# Patient Record
Sex: MALE
[De-identification: names, ages, dates, MRNs, and addresses within clinical notes are randomized per-mention and may not be internally consistent; named-entity substitution may affect disease eponyms.]

---

## 2020-05-01 ENCOUNTER — HOSPITAL ENCOUNTER (OUTPATIENT)
Dept: HOSPITAL 53 - M ED | Age: 25
Setting detail: OBSERVATION
LOS: 1 days | Discharge: PSYCHIATRIC OR SUBSTANCE USE FACILITY | End: 2020-05-02
Attending: INTERNAL MEDICINE | Admitting: INTERNAL MEDICINE
Payer: COMMERCIAL

## 2020-05-01 VITALS — WEIGHT: 174.83 LBS | HEIGHT: 67 IN | BODY MASS INDEX: 27.44 KG/M2

## 2020-05-01 VITALS — DIASTOLIC BLOOD PRESSURE: 85 MMHG | SYSTOLIC BLOOD PRESSURE: 127 MMHG

## 2020-05-01 VITALS — SYSTOLIC BLOOD PRESSURE: 128 MMHG | DIASTOLIC BLOOD PRESSURE: 79 MMHG

## 2020-05-01 DIAGNOSIS — T14.91XA: ICD-10-CM

## 2020-05-01 DIAGNOSIS — F32.9: ICD-10-CM

## 2020-05-01 DIAGNOSIS — M62.82: Primary | ICD-10-CM

## 2020-05-01 DIAGNOSIS — Z91.5: ICD-10-CM

## 2020-05-01 DIAGNOSIS — T36.4X2A: ICD-10-CM
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
## 2020-05-01 LAB
ALBUMIN SERPL BCG-MCNC: 3.7 GM/DL (ref 3.2–5.2)
ALBUMIN SERPL BCG-MCNC: 4.2 GM/DL (ref 3.2–5.2)
ALT SERPL W P-5'-P-CCNC: 55 U/L (ref 12–78)
ALT SERPL W P-5'-P-CCNC: 64 U/L (ref 12–78)
AMPHETAMINES UR QL SCN: NEGATIVE
APAP SERPL-MCNC: < 2 UG/ML (ref 10–30)
BARBITURATES UR QL SCN: NEGATIVE
BASOPHILS # BLD AUTO: 0 10^3/UL (ref 0–0.2)
BASOPHILS NFR BLD AUTO: 0.4 % (ref 0–1)
BENZODIAZ UR QL SCN: NEGATIVE
BILIRUB CONJ SERPL-MCNC: 0.1 MG/DL (ref 0–0.2)
BILIRUB CONJ SERPL-MCNC: 0.1 MG/DL (ref 0–0.2)
BILIRUB SERPL-MCNC: 0.3 MG/DL (ref 0.2–1)
BILIRUB SERPL-MCNC: 0.6 MG/DL (ref 0.2–1)
BUN SERPL-MCNC: 20 MG/DL (ref 7–18)
BUN SERPL-MCNC: 29 MG/DL (ref 7–18)
BZE UR QL SCN: NEGATIVE
CALCIUM SERPL-MCNC: 8.6 MG/DL (ref 8.5–10.1)
CALCIUM SERPL-MCNC: 8.7 MG/DL (ref 8.5–10.1)
CANNABINOIDS UR QL SCN: NEGATIVE
CHLORIDE SERPL-SCNC: 106 MEQ/L (ref 98–107)
CHLORIDE SERPL-SCNC: 109 MEQ/L (ref 98–107)
CK SERPL-CCNC: 5525 U/L (ref 39–308)
CK SERPL-CCNC: 7952 U/L (ref 39–308)
CO2 SERPL-SCNC: 23 MEQ/L (ref 21–32)
CO2 SERPL-SCNC: 27 MEQ/L (ref 21–32)
CREAT SERPL-MCNC: 1.11 MG/DL (ref 0.7–1.3)
CREAT SERPL-MCNC: 1.14 MG/DL (ref 0.7–1.3)
EOSINOPHIL # BLD AUTO: 0.2 10^3/UL (ref 0–0.5)
EOSINOPHIL NFR BLD AUTO: 1.9 % (ref 0–3)
ETHANOL SERPL-MCNC: < 0.003 % (ref 0–0.01)
GFR SERPL CREATININE-BSD FRML MDRD: > 60 ML/MIN/{1.73_M2} (ref 60–?)
GFR SERPL CREATININE-BSD FRML MDRD: > 60 ML/MIN/{1.73_M2} (ref 60–?)
GLUCOSE SERPL-MCNC: 94 MG/DL (ref 70–100)
GLUCOSE SERPL-MCNC: 98 MG/DL (ref 70–100)
HCT VFR BLD AUTO: 45.8 % (ref 42–52)
HGB BLD-MCNC: 15.5 G/DL (ref 13.5–17.5)
LYMPHOCYTES # BLD AUTO: 2 10^3/UL (ref 1.5–5)
LYMPHOCYTES NFR BLD AUTO: 21.7 % (ref 24–44)
MCH RBC QN AUTO: 30.2 PG (ref 27–33)
MCHC RBC AUTO-ENTMCNC: 33.8 G/DL (ref 32–36.5)
MCV RBC AUTO: 89.3 FL (ref 80–96)
METHADONE UR QL SCN: NEGATIVE
MONOCYTES # BLD AUTO: 0.7 10^3/UL (ref 0–0.8)
MONOCYTES NFR BLD AUTO: 7.3 % (ref 0–5)
NEUTROPHILS # BLD AUTO: 6.1 10^3/UL (ref 1.5–8.5)
NEUTROPHILS NFR BLD AUTO: 67.5 % (ref 36–66)
OPIATES UR QL SCN: NEGATIVE
PCP UR QL SCN: NEGATIVE
PLATELET # BLD AUTO: 176 10^3/UL (ref 150–450)
POTASSIUM SERPL-SCNC: 4.1 MEQ/L (ref 3.5–5.1)
POTASSIUM SERPL-SCNC: 4.6 MEQ/L (ref 3.5–5.1)
PROT SERPL-MCNC: 6.6 GM/DL (ref 6.4–8.2)
PROT SERPL-MCNC: 7.4 GM/DL (ref 6.4–8.2)
RBC # BLD AUTO: 5.13 10^6/UL (ref 4.3–6.1)
SALICYLATES SERPL-MCNC: < 1.7 MG/DL (ref 5–30)
SODIUM SERPL-SCNC: 138 MEQ/L (ref 136–145)
SODIUM SERPL-SCNC: 141 MEQ/L (ref 136–145)
TSH SERPL DL<=0.005 MIU/L-ACNC: 2.44 UIU/ML (ref 0.36–3.74)
WBC # BLD AUTO: 9.1 10^3/UL (ref 4–10)

## 2020-05-01 PROCEDURE — 36415 COLL VENOUS BLD VENIPUNCTURE: CPT

## 2020-05-01 PROCEDURE — 80307 DRUG TEST PRSMV CHEM ANLYZR: CPT

## 2020-05-01 PROCEDURE — 84443 ASSAY THYROID STIM HORMONE: CPT

## 2020-05-01 PROCEDURE — 80053 COMPREHEN METABOLIC PANEL: CPT

## 2020-05-01 PROCEDURE — 96361 HYDRATE IV INFUSION ADD-ON: CPT

## 2020-05-01 PROCEDURE — 82550 ASSAY OF CK (CPK): CPT

## 2020-05-01 PROCEDURE — 80048 BASIC METABOLIC PNL TOTAL CA: CPT

## 2020-05-01 PROCEDURE — 96360 HYDRATION IV INFUSION INIT: CPT

## 2020-05-01 PROCEDURE — 93041 RHYTHM ECG TRACING: CPT

## 2020-05-01 PROCEDURE — 83735 ASSAY OF MAGNESIUM: CPT

## 2020-05-01 PROCEDURE — 80076 HEPATIC FUNCTION PANEL: CPT

## 2020-05-01 PROCEDURE — 85025 COMPLETE CBC W/AUTO DIFF WBC: CPT

## 2020-05-01 PROCEDURE — 93005 ELECTROCARDIOGRAM TRACING: CPT

## 2020-05-01 PROCEDURE — 94760 N-INVAS EAR/PLS OXIMETRY 1: CPT

## 2020-05-01 PROCEDURE — 99285 EMERGENCY DEPT VISIT HI MDM: CPT

## 2020-05-01 PROCEDURE — 85027 COMPLETE CBC AUTOMATED: CPT

## 2020-05-01 RX ADMIN — SODIUM CHLORIDE SCH MLS/HR: 9 INJECTION, SOLUTION INTRAVENOUS at 14:30

## 2020-05-01 RX ADMIN — SODIUM CHLORIDE SCH MLS/HR: 9 INJECTION, SOLUTION INTRAVENOUS at 22:24

## 2020-05-01 RX ADMIN — SODIUM CHLORIDE SCH MLS/HR: 9 INJECTION, SOLUTION INTRAVENOUS at 18:09

## 2020-05-01 NOTE — HPEPDOC
General


Date of Admission


May 1, 2020 at 02:27


Date of Service:  May 1, 2020


Attending Physician:  SUNITHA ARAUJO MD


Chief Complaint


The patient is a 24-year-old male admitted with a reason for visit of 

Rhabdomyolysis.


Source:  Patient


Exam Limitations:  No limitations


Timing/Duration:  24 hours





History of Present Illness


23 yo M with a history of depressive episodes, prior suicide attempt by gun that

did not go off, active , who presented after ingesting 21 pills of 100mg

doxycycline tabs with the goal of committing suicide. He immediately regretted 

it and presented to the ED. Of note he reports recent intensive PT training with

chemical suits with prolonged periods of no hydration and frequent bursts of 

intense physical training. He denies any recent specific psychosocial pressures,

has a girlfriend and his family is in [place].





In the ED, he was hemodynamically stable, afebrile and saturating well on room 

air without acute complaints. Workup was notable for CK 7952--5525-->5570, WBC 

9.1, hgb 15.5, Cr 1.1, /ALT 55, with normal electrolytes, NSR on EKG and 

negative tox screen> While in the ED he was given charcoal on arriva, 2L NS and 

started on 250cc/hr NS and poison control was contacted without any specific 

recommendations beyond expectant supportive management. He is now being admitted

to medicine for management of rhabdomyolysis a with psychiatry consult.





Home Medications


Scheduled


Doxycycline Monohydrate (Doxycycline) 100 Mg Capsule, 100 MG PO Q12H, (Reported)


   FILLED 4/28/20 FOR 14 DAYS 





Allergies


Coded Allergies:  


     No Known Allergies (Unverified , 5/1/20)





Past Medical History


Medical History


Depression


Suicide attempt


Surgical History


None





Family History


Significant Family History:  No pertinent family hx





Social History


* Smoker:  Denies


Alcohol:  Denies


Drugs:  denies


Recent Travel/Sick Contacts:  Denies: Recent travel, Recent sick contacts


Psychosocial History:  Depression, Prior suicide attempt, Suicidal thoughts


Active . Reports good psychosocial support with girlfriend and his 

family that is located in [place]. No smoking, alcohol or illicit drug use.





A-FIB/CHADSVASC


A-FIB History


Current/History of A-Fib/PAF?:  No


Current PO Anticoag Therapy:  No





Age/Risk Factor Scoring


CHADSVASC:  








CHADSVASC Response (Comments) Value


 


Age Risk Factor Age < 65 years old 0


 


Gender Risk Factor Male 0


 


Hx of CHF No 0


 


Hx of HTN No 0


 


Hx of Stroke/TIA/or VTE No 0


 


Hx of Diabetes No 0


 


Hx of Vascular Disease No 0


 


Total  0











Treatment


Treatment ordered:  NONE


Reason Anticoagulant not given:  Not indicated/[id number]





Review of Systems


Constitutional:  Denies: Chills, Fever, Night Sweats


Eyes:  Denies: Pain, Vision change


ENT:  Denies: Head Aches, Ear Pain, Dysphagia


Skin:  Denies: Rash, Lesions, Breakdown


Pulmonary:  Denies: Dyspnea, Cough


Cardiovascular:  Denies: Chest Pain, Palpitations, Orthopnea, Paroxysmal Noc. 

Dyspnea, Lt Headedness


Gastrointestinal:  Denies: Nausea, Vomiting, Abdominal Pain, Diarrhea


Genitourinary:  Denies: Dysuria, Frequency, Incontinence, Retention


Hematologic:  Denies: Bruising, Bleeding Excessively


Endocrine:  Denies: Polydipsia, Polyphagia, Polyuria, Heat Intolerance, Cold 

Intolerance, Other Endocrine Sx


Musculoskeletal:  Denies: Neck Pain, Back Pain, Joint Pain, Muscle Pain, Spasms


Neurological:  Denies: Weakness, Numbness, Change in speech, Confusion


Psych:  Reports: Depression, Thoughts of Harming Other





Physical Examination


General Exam:  Positive: Alert, No Acute Distress


Eye Exam:  Positive: PERRLA, Conjunctiva & lids normal, EOMI; 


   Negative: Sclera icteric


ENT Exam:  Positive: Atraumatic, Mucous membr. moist/pink, Pharynx Normal


Neck Exam:  Positive: Supple; 


   Negative: JVD, thyromegaly


Chest Exam:  Positive: Clear to auscultation, Normal air movement


Heart Exam:  Positive: Rate Normal, Regular Rhythm, Normal S1, Normal S2; 


   Negative: Murmurs, Rubs


Telemetry:  Positive: No significant arrhythmia


Abdomen Exam:  Positive: Normal bowel sounds, Soft; 


   Negative: Tenderness, Hepatospenomegaly


Extremity Exam:  Positive: Normal pulses; 


   Negative: Clubbing, Cyanosis, Edema


Skin Exam:  Positive: Nl turgor and temperature; 


   Negative: Breakdown, Lesion


Neuro Exam:  Positive: Normal Gait, Normal Speech, Cranial Nerves 3-12 NL, 

Reflexes 2+


Psych Exam:  Positive: Mental status NL, Oriented x 3, Other (remorseful, teary,

depressed)





Vital Signs





Vital Signs








  Date Time  Temp Pulse Resp B/P (MAP) Pulse Ox O2 Delivery O2 Flow Rate FiO2


 


5/1/20 07:45  67 16 117/65 (82) 98 Room Air  


 


5/1/20 06:00 96.7       











Laboratory Data


Labs 24H


Laboratory Tests 2


5/1/20 02:40: 


Immature Granulocyte % (Auto) 1.2, Neutrophils (%) (Auto) 67.5H, Lymphocytes (%)

(Auto) 21.7L, Monocytes (%) (Auto) 7.3H, Eosinophils (%) (Auto) 1.9, Basophils 

(%) (Auto) 0.4, Neutrophils # (Auto) 6.1, Lymphocytes # (Auto) 2.0, Monocytes # 

(Auto) 0.7, Eosinophils # (Auto) 0.2, Basophils # (Auto) 0.0, Nucleated Red 

Blood Cells % (auto) 0.0, Anion Gap 9, Glomerular Filtration Rate > 60.0, 

Calcium Level 8.7, Total Bilirubin 0.3, Direct Bilirubin 0.1, Aspartate Amino 

Transf (AST/SGOT) 162H, Alanine Aminotransferase (ALT/SGPT) 64, Alkaline 

Phosphatase 73, Total Creatine Kinase 7952H, Total Protein 7.4, Albumin 4.2, 

Albumin/Globulin Ratio 1.31, Thyroid Stimulating Hormone (TSH) 2.440, 

Salicylates Level < 1.7L, Acetaminophen Level < 2.0L, Ethyl Alcohol Level < 0

.003


5/1/20 02:47: 


Urine Opiates Screen NEGATIVE, Urine Methadone Screen NEGATIVE, Urine 

Barbiturates Screen NEGATIVE, Urine Phencyclidine Screen NEGATIVE, Urine 

Amphetamines Screen NEGATIVE, Urine Benzodiazepines Screen NEGATIVE, Urine 

Cocaine Metabolite Screen NEGATIVE, Urine Cannabinoids Screen NEGATIVE


5/1/20 02:58: Bedside Glucose (Misc Panel) 102


5/1/20 10:01: 


Anion Gap 5L, Glomerular Filtration Rate > 60.0, Calcium Level 8.6, Total 

Bilirubin 0.6#, Direct Bilirubin 0.1, Aspartate Amino Transf (AST/SGOT) 113H, 

Alanine Aminotransferase (ALT/SGPT) 55, Alkaline Phosphatase 67, Total Creatine 

Kinase 5525H, Total Protein 6.6, Albumin 3.7, Albumin/Globulin Ratio 1.28


5/1/20 11:44: Total Creatine Kinase 5570H


CBC/BMP


Laboratory Tests


5/1/20 02:40








5/1/20 10:01











 Assessment/Plan


23 yo M with depression and prior suicide attempt who is being admitted for 

management of rhabdomyolysis 2/2 intensive strenuous exercise and suicide 

gesture/attempt with doxycycline.





Rhabdomyolysis:


-s/p 2L NS in the ED


-continue NS at 250cc/hr


-check AM CK


-discussed the importance of hydration during intense physical training





 Suicide gesture with ongoing depression:


-has never been on pharmacotherapy for depression


-psych consult 


-1:1 sitter





DVT ppx: lovenox


Diet: regular


Dispo: medsurg, obs





Plan / VTE


VTE Prophylaxis Ordered?:  Yes











SUNITHA ARAUJO MD    May 1, 2020 14:46

## 2020-05-01 NOTE — ECGEPIP
[facility] - ED

                                       

                                       Test Date:    2020

Pat Name:     MASTER GARCIA              Department:   

Patient ID:   [id number]                 Room:         -

Gender:       Male                     Technician:   gunner

:          1995               Requested By: Misbah CHAVEZ

Order Number: [id number]     Reading MD:   Misbah Horton

                                 Measurements

Intervals                              Axis          

Rate:         81                       P:            49

CA:           153                      QRS:          44

QRSD:         84                       T:            11

QT:           330                                    

QTc:          385                                    

                           Interpretive Statements

SINUS RHYTHM

NONSPECIFIC T WAVE ABNORMALITIES

NO PRIORS FOR COMPARISON

Electronically Signed on 2020 13:01:00 EDT by Misbah Horton

## 2020-05-02 ENCOUNTER — HOSPITAL ENCOUNTER (INPATIENT)
Dept: HOSPITAL 53 - M PSY | Age: 25
LOS: 3 days | Discharge: HOME | DRG: 885 | End: 2020-05-05
Attending: PSYCHIATRY & NEUROLOGY | Admitting: PSYCHIATRY & NEUROLOGY
Payer: COMMERCIAL

## 2020-05-02 VITALS — DIASTOLIC BLOOD PRESSURE: 67 MMHG | SYSTOLIC BLOOD PRESSURE: 117 MMHG

## 2020-05-02 VITALS — HEIGHT: 67 IN | WEIGHT: 170.2 LBS | BODY MASS INDEX: 26.71 KG/M2

## 2020-05-02 VITALS — SYSTOLIC BLOOD PRESSURE: 150 MMHG | DIASTOLIC BLOOD PRESSURE: 84 MMHG

## 2020-05-02 VITALS — SYSTOLIC BLOOD PRESSURE: 117 MMHG | DIASTOLIC BLOOD PRESSURE: 67 MMHG

## 2020-05-02 DIAGNOSIS — F33.2: Primary | ICD-10-CM

## 2020-05-02 DIAGNOSIS — Z91.5: ICD-10-CM

## 2020-05-02 LAB
ALBUMIN SERPL BCG-MCNC: 3.2 GM/DL (ref 3.2–5.2)
ALT SERPL W P-5'-P-CCNC: 48 U/L (ref 12–78)
BILIRUB SERPL-MCNC: 0.9 MG/DL (ref 0.2–1)
BUN SERPL-MCNC: 12 MG/DL (ref 7–18)
CALCIUM SERPL-MCNC: 8.3 MG/DL (ref 8.5–10.1)
CHLORIDE SERPL-SCNC: 111 MEQ/L (ref 98–107)
CK SERPL-CCNC: 2564 U/L (ref 39–308)
CO2 SERPL-SCNC: 27 MEQ/L (ref 21–32)
CREAT SERPL-MCNC: 0.98 MG/DL (ref 0.7–1.3)
GFR SERPL CREATININE-BSD FRML MDRD: > 60 ML/MIN/{1.73_M2} (ref 60–?)
GLUCOSE SERPL-MCNC: 94 MG/DL (ref 70–100)
HCT VFR BLD AUTO: 40.8 % (ref 42–52)
HGB BLD-MCNC: 14.1 G/DL (ref 13.5–17.5)
MAGNESIUM SERPL-MCNC: 1.9 MG/DL (ref 1.8–2.4)
MCH RBC QN AUTO: 31.3 PG (ref 27–33)
MCHC RBC AUTO-ENTMCNC: 34.6 G/DL (ref 32–36.5)
MCV RBC AUTO: 90.7 FL (ref 80–96)
PLATELET # BLD AUTO: 164 10^3/UL (ref 150–450)
POTASSIUM SERPL-SCNC: 4.2 MEQ/L (ref 3.5–5.1)
PROT SERPL-MCNC: 6 GM/DL (ref 6.4–8.2)
RBC # BLD AUTO: 4.5 10^6/UL (ref 4.3–6.1)
SODIUM SERPL-SCNC: 140 MEQ/L (ref 136–145)
WBC # BLD AUTO: 5.9 10^3/UL (ref 4–10)

## 2020-05-02 RX ADMIN — SODIUM CHLORIDE SCH MLS/HR: 9 INJECTION, SOLUTION INTRAVENOUS at 05:53

## 2020-05-02 RX ADMIN — SODIUM CHLORIDE SCH MLS/HR: 9 INJECTION, SOLUTION INTRAVENOUS at 02:01

## 2020-05-02 NOTE — DS.PDOC
Discharge Summary


General


Date of Admission


May 1, 2020 at 02:27


Date of Discharge


5/2/2020


Attending Physician:  SUNITHA ARAUJO MD





Discharge Summary


PROCEDURES PERFORMED DURING STAY: None





ADMITTING DIAGNOSES: 


1. Rhabdomyolysis.





DISCHARGE DIAGNOSES:


1. Rhabdomyolysis.


2. Suicidal gesture





COMPLICATIONS/CHIEF COMPLAINT: Rhabdomyolysis.





HISTORY OF PRESENT ILLNESS: 


25 yo M with a history of depressive episodes, prior suicide attempt by gun that

did not go off, active , who presented after ingesting 21 pills of 100mg

doxycycline tabs with the goal of committing suicide. He immediately regretted 

it and presented to the ED. Of note he reports recent intensive PT training with

chemical suits with prolonged periods of no hydration and frequent bursts of 

intense physical training. He denies any recent specific psychosocial pressures,

has a girlfriend and his family is in [place].





HOSPITAL COURSE: 


In the ED, he was hemodynamically stable, afebrile and saturating well on room 

air without acute complaints. Workup was notable for CK 7952--5525-->5570, WBC 9

.1, hgb 15.5, Cr 1.1, /ALT 55, with normal electrolytes, NSR on EKG and 

negative tox screen> While in the ED he was given charcoal on arrival, 2L NS and

started on 250cc/hr NS and poison control was contacted without any specific 

recommendations beyond expectant supportive management. He was admitted to 

medicine for management of rhabdomyolysis and continued on IV fluids with 

eventual downtrend of his CK. He is now being discharged to [facility] after 

evaluation by psychiatry.





DISCHARGE MEDICATIONS: Please see below.


 


ALLERGIES: Please see below.





PHYSICAL EXAMINATION ON DISCHARGE:


VITAL SIGNS: Please see below.


GENERAL: NAD


HEENT: NCAT, PERRLA, EOMI, MMM


NECK: supple, no JVD


CARDIOVASCULAR EXAMINATION: RRR, no mrg


RESPIRATORY EXAMINATION: CTAB


ABDOMINAL EXAMINATION: Normoactive sounds, soft, NTND


EXTREMITIES: WWP, no edema


SKIN: No rashes, sequela of bleeding or lesions


NEUROLOGICAL EXAMINATION: CN2-12 intact, normal gait, 5/5 strength and normal 

tone


PSYCHIATRIC EXAMINATION: Aox3, remorseful about suicidal gesture, depressed





LABORATORY DATA: Please see below.





IMAGING: None this admission 





PROGNOSIS: Good





ACTIVITY: As tolerated





DIET: Regular





DISCHARGE PLAN: [facility]





DISPOSITION: [facility]





DISCHARGE INSTRUCTIONS:


1. Follow up with mental health provider





ITEMS TO FOLLOWUP ON ON OUTPATIENT:


1. Depression with suicidal ideation





DISCHARGE CONDITION: Stable





TIME SPENT ON DISCHARGE: 31 minutes.





Vital Signs/I&Os





Vital Signs








  Date Time  Temp Pulse Resp B/P (MAP) Pulse Ox O2 Delivery O2 Flow Rate FiO2


 


5/2/20 06:00 97.7 54 18 117/67 (84) 97 Room Air  














I&O- Last 24 Hours up to 6 AM 


 


 5/2/20





 06:00


 


Intake Total 1920 ml


 


Output Total 0 ml


 


Balance 1920 ml











Laboratory Data


Labs 24H


Laboratory Tests 2


5/1/20 10:01: 


Anion Gap 5L, Glomerular Filtration Rate > 60.0, Calcium Level 8.6, Total 

Bilirubin 0.6#, Direct Bilirubin 0.1, Aspartate Amino Transf (AST/SGOT) 113H, 

Alanine Aminotransferase (ALT/SGPT) 55, Alkaline Phosphatase 67, Total Creatine 

Kinase 5525H, Total Protein 6.6, Albumin 3.7, Albumin/Globulin Ratio 1.28


5/1/20 11:44: Total Creatine Kinase 5570H


5/2/20 05:34: 


Anion Gap 2L, Glomerular Filtration Rate > 60.0, Calcium Level 8.3L, Total 

Bilirubin 0.9, Aspartate Amino Transf (AST/SGOT) 66H, Alanine Aminotransferase 

(ALT/SGPT) 48, Alkaline Phosphatase 59, Total Creatine Kinase 2564H, Total 

Protein 6.0L, Albumin 3.2, Albumin/Globulin Ratio 1.14, Nucleated Red Blood 

Cells % (auto) 0.0, Magnesium Level 1.9


CBC/BMP


Laboratory Tests


5/1/20 10:01








5/2/20 05:34











Discharge Medications


Scheduled


Doxycycline Monohydrate (Doxycycline) 100 Mg Capsule, 100 MG PO Q12H, (Reported)


   FILLED 4/28/20 FOR 14 DAYS 





Allergies


Coded Allergies:  


     No Known Allergies (Unverified , 5/1/20)











SUNITHA ARAUJO MD    May 2, 2020 07:36

## 2020-05-03 VITALS — DIASTOLIC BLOOD PRESSURE: 81 MMHG | SYSTOLIC BLOOD PRESSURE: 134 MMHG

## 2020-05-03 VITALS — DIASTOLIC BLOOD PRESSURE: 56 MMHG | SYSTOLIC BLOOD PRESSURE: 114 MMHG

## 2020-05-03 NOTE — HPEPDOC
General


Date of Admission


May 2, 2020 at 19:30


Date of Service:  May 3, 2020


Attending Physician:  SUNITHA ARAUJO MD


Chief Complaint


The patient is a 24-year-old male admitted with a reason for visit of Other 

Unspecified Depressive Disorder.


Source:  Patient


Exam Limitations:  No limitations





History of Present Illness


25 yo M with a history of depressive episodes, prior suicide attempt by gun that

malfunctioned, active , who presented after ingesting 21 pills of 100mg 

doxycycline tabs with the goal of committing suicide, while in intensive PT 

training with chemical suits with prolonged periods of no hydration and frequent

bursts of intense physical training. i recently admitted him to medicine for 

rhabdomyolysis after he was found to have an elevated CK to 7952 and hydrated 

him with IVF with improved of his rhabdo. Thankfully is renal function was at 

baseline and he had a robust response. After discussing the suicidal 

gesture/attempt, I consulted psychiatry and his was evaluated by Dr. Reyes who 

recommended discharge to the [facility] for evaluation and treatment of his depression

with suicidal ideation and recent gesture/attempt. Today, he reports that he is 

doing well without physical complaints except being anxious to be discharged and

following up as an outpatient.





Allergies


Coded Allergies:  


     No Known Allergies (Unverified , 5/1/20)





Past Medical History


Medical History


Depression with prior suicide attempts


Surgical History


none





Family History


Significant Family History:  No pertinent family hx





Social History


* Smoker:  Denies


Alcohol:  Denies


Drugs:  denies


Recent Travel/Sick Contacts:  Denies: Recent travel, Recent sick contacts


Psychosocial History:  Decreased mood, Depression, Suicidal thoughts


Active . No nicotine dependence, alcohol or illicit drug use





A-FIB/CHADSVASC


A-FIB History


Current/History of A-Fib/PAF?:  No


Current PO Anticoag Therapy:  No





Age/Risk Factor Scoring


CHADSVASC:  








CHADSVASC Response (Comments) Value


 


Age Risk Factor Age < 65 years old 0


 


Gender Risk Factor Male 0


 


Hx of CHF No 0


 


Hx of HTN No 0


 


Hx of Stroke/TIA/or VTE No 0


 


Hx of Diabetes No 0


 


Hx of Vascular Disease No 0


 


Total  0











Treatment


Treatment ordered:  NONE


Reason Anticoagulant not given:  Not indicated/[id number]





Review of Systems


Constitutional:  Denies: Chills, Fever, Night Sweats


Eyes:  Denies: Pain, Vision change


ENT:  Denies: Head Aches, Ear Pain, Dysphagia


Skin:  Denies: Rash, Lesions, Breakdown


Pulmonary:  Denies: Dyspnea, Cough


Cardiovascular:  Denies: Chest Pain, Palpitations, Orthopnea, Paroxysmal Noc. 

Dyspnea, Lt Headedness


Gastrointestinal:  Denies: Nausea, Vomiting, Abdominal Pain, Diarrhea


Genitourinary:  Denies: Dysuria, Frequency, Incontinence, Retention


Hematologic:  Denies: Bruising, Bleeding Excessively


Endocrine:  Denies: Polydipsia, Polyphagia, Polyuria, Heat Intolerance, Cold 

Intolerance, Other Endocrine Sx


Musculoskeletal:  Denies: Neck Pain, Back Pain, Joint Pain, Muscle Pain, Spasms


Neurological:  Denies: Weakness, Numbness, Change in speech, Confusion


Psych:  Reports: Anxiety (to be discharged), Depression; 


   Denies: Thoughts of Self Harm





Physical Examination


General Exam:  Positive: Alert, No Acute Distress


Eye Exam:  Positive: PERRLA, Conjunctiva & lids normal, EOMI; 


   Negative: Sclera icteric


ENT Exam:  Positive: Atraumatic, Mucous membr. moist/pink, Pharynx Normal


Neck Exam:  Positive: Supple; 


   Negative: JVD, thyromegaly


Chest Exam:  Positive: Clear to auscultation, Normal air movement


Heart Exam:  Positive: Rate Normal, Regular Rhythm, Normal S1, Normal S2; 


   Negative: Murmurs, Rubs


Abdomen Exam:  Positive: Normal bowel sounds, Soft; 


   Negative: Tenderness, Hepatospenomegaly


Extremity Exam:  Positive: Normal pulses; 


   Negative: Clubbing, Cyanosis, Edema


Skin Exam:  Positive: Nl turgor and temperature; 


   Negative: Breakdown, Lesion


Neuro Exam:  Positive: Normal Gait, Normal Speech, Cranial Nerves 3-12 NL, 

Reflexes 2+


Psych Exam:  Positive: Mental status NL, Oriented x 3





Vital Signs





Vital Signs








  Date Time  Temp Pulse Resp B/P (MAP) Pulse Ox O2 Delivery O2 Flow Rate FiO2


 


5/3/20 05:56 97.3 73 14 114/56 (75) 99 Room Air  











 Assessment/Plan


25 yo M with a history of depressive episodes, prior suicide attempt by gun that

malfunctioned, active , who presented after ingesting 21 pills of 100mg 

doxycycline tabs with the goal of committing suicide, while in intensive PT 

training with chemical suits with prolonged periods of no hydration and frequent

bursts of intense physical training, who was recently discharged from medicine 

after aggressive hydration for rhabdomyolysis now admitted to the [facility] for 

depression and suicidal gesture/attempt.





Recent rhabdomyolysis:


-s/p aggressive hydration while in inpatient medicine


-At this time counselled patient on adequate hydration especially during 

intensive training





Depression with suicidal gesture/attempt


-Will defer to the primary psychiatry team





At this time, medicine will sign off.





Plan / VTE


VTE Prophylaxis Ordered?:  No


VTE Exclusion Mechanical Proph:  Low Risk for VTE


VTE Exclusion Pharmacological:  At Low Risk for VTE











SUNITHA ARAUJO MD    May 3, 2020 09:11

## 2020-05-04 VITALS — SYSTOLIC BLOOD PRESSURE: 157 MMHG | DIASTOLIC BLOOD PRESSURE: 73 MMHG

## 2020-05-04 VITALS — DIASTOLIC BLOOD PRESSURE: 75 MMHG | SYSTOLIC BLOOD PRESSURE: 132 MMHG

## 2020-05-04 NOTE — MHHPE
DATE OF ADMISSION:  05/02/2020

 

Telemedicine Video Assessment.

 

VITAL SIGNS:  Blood pressure 114/56.  Pulse 73.  Temperature 97.3.

 

CHIEF COMPLAINT:  Feels depressed.

 

SUBJECTIVE:

He is 24-years-old.  He is seen in the presence of staff.  He is admitted to the

hospital, to the inpatient psychiatry unit, from the medical floor, where I had

seen him yesterday on consultation, after he had taken a considerable overdose.

He overdosed on doxycycline, which he had just been prescribed for orchitis and

epididymitis.  He had taken the overdose in an attempt to kill himself.  This is

the second time he has tried killing himself, the first was a couple of years ago

in [place], he tried shooting himself, but the gun did not go off.   Please refer

to my consultation note for details related to the circumstances of his coming to

the hospital, background history, as well as mental status exam at the time, and

the reason for his hospitalization to inpatient mental health.

 

He says he thought he was going to go back to the emergency room, and that

matters were going to be quite enclosed there, but that he feels a bit better

being in the inpatient psychiatry unit.  He says sleep was reasonably good.  He

says he has been in touch with his girlfriend, and it happened today.  She is

[place]se[place], and that went well.  He says he also spoke with his sister, who is

close to, in [place].  He indicated he will probably tell his mother about his

being here eventually, but not his father, concerned father may see this as a

sign of weakness.  Says he is not too bothered, however, what his father may

think.  He also alluded to his parents having a difficult relationship, and that

he had witnessed the difficulties, and felt that was "normal".  Says he does not

think he had nightmares related to that, but began having difficulties with his

own relationships with others.  Says they moved quite a bit because of his father

being in the , he feels that is where his self doubts about forming

relationships set in.

 

PAST PSYCHIATRIC HISTORY AND BACKGROUND HISTORY:

Please refer to previous summaries.

 

Past psychiatric history is significant for his being depressed a couple of years

ago in [place], it lasted for a few months, and on one occasion he tried killing

himself, a gun that did not go off.  He says he took that as a "wake up call".

 

MENTAL STATUS EXAMINATION:

He is neat.  He is wearing a mask because of the virus precautions.  He is

cooperative and possibly a little less guarded than yesterday.  No agitation.  No

psychomotor retardation.  He is coherent.  Affect is somewhat restricted in

range, as far as I can tell.  Denies any thoughts of harming himself at present.

No homicidal ideas or intents.  No evidence of any psychosis.  Intellect average.

Cognition grossly intact.  Judgment and insight remain compromised overall.

 

ASSESSMENT:  Other specified depressive disorder.

Consider major depressive disorder, recurrent.

Status post overdose.

Interpersonal difficulties, in terms of relationships, maintaining them.

Difficult childhood, particularly parents' marriage.

 

PLAN:

He is admitted to the inpatient psych unit and placed on relevant precautions.

We will look at obtaining collateral information.

He will receive a medicine consult if indicated.  He had just been cleared by

medicine yesterday.  He had rhabdomyolysis, and it is thought that his recent

bout of training may also have contributed to that.

He will be encouraged to participate in activities in the unit.

He would potentially benefit from using an antidepressant, and we discussed this,

given his previous bout of what seems to have been quite significant depression,

on top of the prior episode, and now the more recent one, which is current.

After discussion of the various risks and benefits, drawbacks, alternatives,

which he understands, he is started on Prozac at 10 mg daily.  This will need to

be titrated up, provided he tolerates it.  He is aware that he may not need to

await the titration in the hospital, depending on how he does.

He will be discharged with followup once he is stable.  He will be seeing the

assigned psychiatrist tomorrow, when further recommendations will be made,

depending on the clinical picture.  I would anticipate a 3-5 day stay.  Followup

will be at [facility].

 

The assessment took 30 minutes.

## 2020-05-04 NOTE — MHIPNPDOC
[facility] Progress Note


Progress Note








Inpatient Progress Note


Vishnu Mathews


MRN: N/[id number]


Date of Birth: N/A


Date of Service: 05/04/2020


History of Present Illness


24-year-old man presented after overdosing on doxycycline. Has a history of 

depression and multiple stressors.


Interval History


The patient is met with today. He reports he is doing well on the Prozac with no

side effects. He reports he is doing much improved from his depression with more

insight, less fatigue and better motivation. He has been amenable to treatment 

and will engaged per staff reports.


Review Of Systems


General: Denies fever or appetite changes 


Cardiovascular: Denies Chest pain or palpations


GI: Denies Nausea, vomiting, or bowel changes


Respiratory: Denies shortness of breath or cough


Neuro: Denies dizziness, tremors


Derm: Denies any rashes or pruritus


: Denies any dysuria or urinary problems 


MSK: Denies any muscle tightness or stiffness


HEENT: Denies any vision changes or headaches


Psychotherapy


None on this visit.


Vital Signs


Reviewed.


Mental Status Examination


General: Well dressed with good hygiene


Speech: Spontaneous and fluid


Thought processes: Linear and logical


MSK: Smooth and coordinated gait, no signs of tremors or involuntary orofacial 

movements


Thought content: Future orientated


Abstract reasoning, and computation: Intact


Description of associations: Intact


Description of abnormal or psychotic thoughts: Denies any suicidal or homicidal 

ideation. Denies any auditory or visual hallucinations. Does not appear to be 

responding to internal stimuli. Does not appear to be endorsing any bizarre or 

paranoid ideation.


Judgment: fair


Insight: fair


Orientation: Alert and orientated 3


Cognition: Grossly normal


Recent and remote memory: Intact


Attention span and concentration: Intact


Fund of knowledge: Adequate


Mood: "okay"


Affect: Euthymic with a full range


Diagnoses


MDD, moderate to severe, recurrent.


Assessment and Plan


MDD: Continue Prozac.


Disposition


Discharge tomorrow if continues to improve.


Time Spent


15 minutes.


Monday





Vital Signs





Vital Signs








  Date Time  Temp Pulse Resp B/P (MAP) Pulse Ox O2 Delivery O2 Flow Rate FiO2


 


5/4/20 06:20 97.6 61 14 132/75 (94) 99 Room Air  











Current Medications





Current Medications








 Medications


  (Trade)  Dose


 Ordered  Sig/Ruben


 Route


 PRN Reason  Start Time


 Stop Time Status Last Admin


Dose Admin


 


 Acetaminophen


  (Tylenol Tab)  650 mg  Q6HP  PRN


 PO


 HEADACHE or DISCOMFORT  5/2/20 18:30


    5/3/20 08:05





 


 Al Hydrox/Mg


 Hydrox/Simethicone


  (Mylanta)  30 ml  Q4HP  PRN


 PO


 HEARTBURN/INDIGESTION  5/2/20 18:30


     





 


 Fluoxetine HCl


  (PROzac)  10 mg  DAILY


 PO


   5/3/20 09:00


    5/4/20 09:05





 


 Lorazepam


  (Ativan)  1 mg  BIDP  PRN


 PO


 ANXIETY/AGITATION  5/2/20 18:30


     





 


 Magnesium


 Hydroxide


  (Milk Of


 Magnesia)  30 ml  DAILYPRN  PRN


 PO


 CONSTIPATION  5/2/20 18:30


     





 


 Trazodone HCl


  (Desyrel)  50 mg  QHSP  PRN


 PO


 INSOMNIA  5/2/20 18:30


     














Allergies


Coded Allergies:  


     No Known Allergies (Unverified , 5/1/20)











MAEGAN HARDWICK DO               May 4, 2020 09:09

## 2020-05-04 NOTE — CR
TELEMEDICINE VIDEO ASSESSMENT

 

DATE OF CONSULTATION:  05/02/2020

 

CONSULTATION FOR THE HOSPITALIST

 

he chart is reviewed.  The patient is interviewed.  He is interviewed in the

presence of a staff member.

 

CHIEF COMPLAINT:  He took an overdose.

 

SUBJECTIVE:

He is 24-years-old.  He is single.  He has a girlfriend, who apparently is also

in the , she is in [facility] at the moment, and returns in a month or so.

He has no children.  The patient is based at [place], says has been here

possibly about a year or so, was deployed to [facility], returned a couple of months

or so ago, says no  related traumas.  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

or injuries.  He says he was doing well, but a few weeks ago began experiencing

self doubts, suggests it tends to happen periodically, and he indicated they are

related to when he was 15, but did not go into details, says when such thoughts

creep in he feels somewhat depressed, and feels anxious as well.  Says this

tended to worsen, he decided to go to [facility], says has begun

seeing a clinician there, and is due to have his third visit or so on Monday

(today is Saturday).  Over the last couple of weeks, matters have become more

difficult, says he had a hard time getting out of bed, and was eating because he

was supposed to, but he did not think that this impacted his work to any greater

extent, has been doing well there, including training.  Says has a few friends in

the unit, some he possibly also can confide in.  Matters worsened, felt more

depressed, more anxious.  Says had a harder time getting out of bed, but managed

to do so, also had fleeting suicidal thoughts, he says had no firm plans, and

that talking to friends, as well as his girlfriend and his sister, who is in [place], who tend to help.  He was recently also treated for left sided orchitis

with epididymitis, was given doxycycline.  He felt further depressed a couple of

days ago and anxious, and took an overdose.  He took more than 20 doxycycline.

Says he regretted it immediately afterwards, and tried throwing up, and was

brought to the hospital.  He was assessed and admitted to the medical service

because of rhabdomyolysis.  I was asked to see him as he had been cleared by the

hospitalist, Dr. Staton.  He says he feels better, and that he should have "reached

out" to others, says plans to do so, including a friend at the [employer].  Says

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  Says he has continued doing well

at work overall.  Indicates had had a similar episode a couple of years ago when

he was in [place], had felt stressed, with self doubts, which he implies are

associated with relationships, and a similar spiral into what he terms a "rabbit

hole", with increasing anxiety, depression, poor sleep, poor appetite as well,

low energy.  During that time, he says he felt hopeless, suicidal, and tried

killing himself by shooting himself, but says the gun did not go off, he called

it a "bad round".  He was seen by a therapist, he was in the  at that

time, says he felt well after a couple of months.  Says no medications were

offered at the time and in fact the therapist preferred that he did not take any.

Says has generally done well for the most part since then, until recently.  No

history consistent with hypomania nor patti.  No obsessions.  No compulsions.  No

head injuries.  Denies any nightmares or flashbacks related to events in the

past, did not indicate any particular events that bothered him, but suggested had

thoughts related to when he was a teenager, around 15 or so.  Did not allude to

any abuse at the time.

 

PAST PSYCHIATRIC HISTORY:

As indicated above.  At first inpatient hospitalization, he attempted to kill

himself two years ago, and then the other day.  Has seen a therapist a couple of

years ago, and had just started seeing one at [place] now.  Next visit is in a

couple of days.

 

FAMILY PSYCHIATRIC HISTORY:  Denies any as far as he is aware.

 

SUBSTANCE ABUSE HISTORY:  None significantly.

 

MEDICAL HISTORY:  Recently treated for orchitis and epididymitis.

 

SOCIAL HISTORY:

He says he has lived in various bases, his father was in the , they lived

at various stations, and at one time they were in [place], says was there for a

bit, and then upon return mostly stayed in [place], where he says most of his

family still lives.  He spoke of a toxic atmosphere at home, but did not go into

details.  Says his parents ended up  after 22 years of marriage.  He has

one sibling, a younger sister.  Says he is close to her, as well as his parents.

He has been in the  for almost three years.  No injuries, no traumas

there.  Denies any head injury.  He has a girlfriend, they have been together for

six months, says it is a good relationship.  He also suggests that it was

generally easy for him to make friends in his teenage years, but that it was

difficult to maintain them, and that somehow he would tend to push them away,

suggests that that tendency has to some extent continued.  Says has been doing

well at work, has friends, but feels he ought to be a bit more open with others.

 

 

MENTAL STATUS EXAMINATION:

He is neat, generally cooperative, but a bit guarded at times.  There is no

agitation.  No psychomotor retardation.  He is sitting up in bed.  No abnormal

movements noted.  He is coherent.  Affect is restricted in range, shows some

reactivity.  Denies any thoughts of harming himself at present.  Denies any

suicidal thoughts or intents, or any plans.  Denies any homicidal ideas or

intents.  No evidence of any psychosis.  There is no fluctuation of

consciousness.  Cognition is grossly intact.  He does not appear to be internally

preoccupied.  Intellect average.  Judgment is compromised.  Insight is fair at

best.

 

VITAL SIGNS:

Blood pressure 117/67.  Pulse 64.  Temperature 97.7.

 

INVESTIGATIONS:

Urine toxicology is negative.  A metabolic profile within normal limits except

for chloride of 111, total bilirubin 0.3, total creatinine kinase 2564 today, and

on initial evaluation yesterday was 7962 ().  Complete blood count

essentially within normal limits.

 

ASSESSMENT:

Other specified depressive disorder.

Status post overdose.

Rule out major depressive disorder, recurrent.

Interpersonal difficulties, in terms of making and maintaining friends.

 

Has been clinically significantly depressed, the last few weeks, anxious as well,

and these are triggered by thoughts related to when he was a teenager, and

subsequent years, unclear if any traumatic events, but alludes to difficulties

with friendships and maintaining them.  Tends to be possibly overly critical of

himself.  Had a childhood characterized by his parents difficult marriage.

Was clinically significantly depressed and almost killed himself two years ago,

but for a malfunctioning gun.  He has now taken a substantial overdose, which has

resulted in rhabdomyolysis, which is improving.

It is quite possible he may be minimizing his difficulties, and the intensity of

his struggles.

 

RECOMMENDATIONS:

Given the above, the patient needs inpatient psychiatric hospitalization for

further assessment, evaluation and management.  The intensity of his moods and

the overdose as well as past history of significance, all point to the impact on

his functioning.  He does not wish to go to the psychiatry unit, wishes to be

discharged.  I am quite concerned about that, and at present he meets criteria

for involuntary hospitalization.  He has been medically cleared for admission.

He says he is also eager to leave as he can hope to then communicate with his

girlfriend, it is her birthday tomorrow.  We will look to see if we can arrange

for him to contact her tomorrow.  He is aware of this.  I have also suggested he

may wish to consider using an antidepressant, given previous episodes and we will

discuss it further.

 

Thank you for the consult.  If any questions, please call.

 

The assessment took 45 minutes.

## 2020-05-05 VITALS — DIASTOLIC BLOOD PRESSURE: 79 MMHG | SYSTOLIC BLOOD PRESSURE: 134 MMHG

## 2020-05-05 NOTE — MHDSPDOC
[facility] Discharge Summary


Discharge Summary


DATE OF ADMISSION: May 2, 2020 at 19:30 


DATE OF DISCHARGE: 5/5/20








Discharge


Vishnu Mathews


MRN: N/A


Date of Birth: N/A


Date of Service: 05/05/2020


Diagnoses


MDD, moderate to severe, recurrent.


History of Present Illness


24-year-old man presented after overdosing on doxycycline. Has a history of 

depression and multiple stressors.


Consultants Involved


Hospitalist/PCP screening


Treatment and Progress On The Unit


The patient was admitted to the inpatient mental health unit. He was then tried 

on Prozac 10 mg with positive results, improvement in mood, loss of interest and

energy levels. He did well on the unit with no further suicidal ideation and 

subsequently requested discharge. He had no behavioral problems around the unit,

engaged well.


Discharge Assessment


24-year-old man with likely major depression presents after overdose, treated 

appropriately with antidepressant. He improves well and is discharged from the 

unit as requested.


The patient at the time of discharge did not meet criteria for involuntary 

admission/extension due to having a normal mental status exam, fair insight into

the situation, They are engaged in the discharge process, as well as being 

friendly and amenable in behavioral control and havent been engaging in any 

observed concerning behavior or ideation recently. They decline voluntary ext

ension/admission at this time and must be discharged in good david, as Im 

unable to make a case for holding the patient against their will. They may have 

historical risk factors of admissions and other interactions with psychiatry 

however, those are not modifiable from a clinical perspective. The patient will 

need to be discharged in good david.


Mental Status Examination


General: Well dressed with good hygiene


Speech: Spontaneous and fluid


Thought processes: Linear and logical


MSK: Smooth and coordinated gait, no signs of tremors or involuntary orofacial 

movements


Thought content: Future orientated


Abstract reasoning, and computation: Intact


Description of associations: Intact


Description of abnormal or psychotic thoughts: Denies any suicidal or homicidal 

ideation. Denies any auditory or visual hallucinations. Does not appear to be 

responding to internal stimuli. Does not appear to be endorsing any bizarre or 

paranoid ideation.


Judgment: fair


Insight: fair


Orientation: Alert and orientated 3


Cognition: Grossly normal


Recent and remote memory: Intact


Attention span and concentration: Intact


Fund of knowledge: Adequate


Mood: "okay"


Affect: Euthymic with a full range


Follow Up








The social work team worked during the predischarge meeting in order to evaluate

for further issues of lethality address them fully before discharge. They worked

on safety planning with the patient's family members in order to ensure that the

patient will have a safe and effective discharge.


Time Spent


The amount of time spent in the coordination of care for this patient was 

approximately 45 minutes.


Tuesday





Vital Signs/I&Os





Vital Signs








  Date Time  Temp Pulse Resp B/P (MAP) Pulse Ox O2 Delivery O2 Flow Rate FiO2


 


5/5/20 06:10 98.3 54 14 134/79 (97) 96 Room Air  











Medications


Scheduled


Fluoxetine Hcl (Fluoxetine HCl) 10 Mg Capsule, 10 MG PO DAILY for mood for 7 

Days, #7





Allergies


Coded Allergies:  


     No Known Allergies (Unverified , 5/1/20)











MAEGAN HARDWICK DO               May 5, 2020 09:43

## 2023-04-10 ENCOUNTER — HOSPITAL ENCOUNTER (EMERGENCY)
Dept: HOSPITAL 53 - M ED | Age: 28
LOS: 1 days | Discharge: HOME | End: 2023-04-11
Payer: COMMERCIAL

## 2023-04-10 VITALS — WEIGHT: 211.2 LBS | BODY MASS INDEX: 33.15 KG/M2 | HEIGHT: 67 IN

## 2023-04-10 DIAGNOSIS — T88.7XXA: ICD-10-CM

## 2023-04-10 DIAGNOSIS — R00.2: ICD-10-CM

## 2023-04-10 DIAGNOSIS — R42: Primary | ICD-10-CM

## 2023-04-10 PROCEDURE — 93005 ELECTROCARDIOGRAM TRACING: CPT

## 2023-04-10 PROCEDURE — 71046 X-RAY EXAM CHEST 2 VIEWS: CPT

## 2023-04-10 PROCEDURE — 99285 EMERGENCY DEPT VISIT HI MDM: CPT

## 2023-04-10 PROCEDURE — 80048 BASIC METABOLIC PNL TOTAL CA: CPT

## 2023-04-10 PROCEDURE — 84443 ASSAY THYROID STIM HORMONE: CPT

## 2023-04-10 PROCEDURE — 94760 N-INVAS EAR/PLS OXIMETRY 1: CPT

## 2023-04-10 PROCEDURE — 85025 COMPLETE CBC W/AUTO DIFF WBC: CPT

## 2023-04-10 PROCEDURE — 84439 ASSAY OF FREE THYROXINE: CPT

## 2023-04-10 PROCEDURE — 93041 RHYTHM ECG TRACING: CPT

## 2023-04-10 PROCEDURE — 96374 THER/PROPH/DIAG INJ IV PUSH: CPT

## 2023-04-11 VITALS — DIASTOLIC BLOOD PRESSURE: 87 MMHG | SYSTOLIC BLOOD PRESSURE: 143 MMHG

## 2023-04-11 LAB
BASOPHILS # BLD AUTO: 0.1 10^3/UL (ref 0–0.2)
BASOPHILS NFR BLD AUTO: 0.5 % (ref 0–1)
BUN SERPL-MCNC: 14 MG/DL (ref 9–23)
CALCIUM SERPL-MCNC: 9 MG/DL (ref 8.5–10.1)
CHLORIDE SERPL-SCNC: 107 MMOL/L (ref 98–107)
CO2 SERPL-SCNC: 26 MMOL/L (ref 20–31)
CREAT SERPL-MCNC: 0.98 MG/DL (ref 0.7–1.3)
EOSINOPHIL # BLD AUTO: 0.2 10^3/UL (ref 0–0.5)
EOSINOPHIL NFR BLD AUTO: 2.3 % (ref 0–3)
GFR SERPL CREATININE-BSD FRML MDRD: > 60 ML/MIN/{1.73_M2} (ref 60–?)
GLUCOSE SERPL-MCNC: 98 MG/DL (ref 60–100)
HCT VFR BLD AUTO: 45.8 % (ref 42–52)
HGB BLD-MCNC: 15.6 G/DL (ref 13.5–17.5)
LYMPHOCYTES # BLD AUTO: 2.4 10^3/UL (ref 1.5–5)
LYMPHOCYTES NFR BLD AUTO: 24.8 % (ref 24–44)
MCH RBC QN AUTO: 30.4 PG (ref 27–33)
MCHC RBC AUTO-ENTMCNC: 34.1 G/DL (ref 32–36.5)
MCV RBC AUTO: 89.1 FL (ref 80–96)
MONOCYTES # BLD AUTO: 0.6 10^3/UL (ref 0–0.8)
MONOCYTES NFR BLD AUTO: 5.9 % (ref 2–8)
NEUTROPHILS # BLD AUTO: 6.4 10^3/UL (ref 1.5–8.5)
NEUTROPHILS NFR BLD AUTO: 65.8 % (ref 36–66)
PLATELET # BLD AUTO: 200 10^3/UL (ref 150–450)
POTASSIUM SERPL-SCNC: 4.6 MMOL/L (ref 3.5–5.1)
RBC # BLD AUTO: 5.14 10^6/UL (ref 4.3–6.1)
SODIUM SERPL-SCNC: 139 MMOL/L (ref 136–145)
T4 FREE SERPL-MCNC: 1.07 NG/DL (ref 0.89–1.76)
TSH SERPL DL<=0.005 MIU/L-ACNC: 2.5 UIU/ML (ref 0.55–4.78)
WBC # BLD AUTO: 9.8 10^3/UL (ref 4–10)

## 2023-06-05 ENCOUNTER — HOSPITAL ENCOUNTER (OUTPATIENT)
Dept: HOSPITAL 53 - M PLAIMG | Age: 28
End: 2023-06-05
Payer: COMMERCIAL

## 2023-06-05 DIAGNOSIS — J34.2: ICD-10-CM

## 2023-06-05 DIAGNOSIS — Z77.090: ICD-10-CM

## 2023-06-05 DIAGNOSIS — M41.9: Primary | ICD-10-CM

## 2023-06-05 DIAGNOSIS — M25.552: ICD-10-CM
